# Patient Record
Sex: FEMALE | Race: BLACK OR AFRICAN AMERICAN | NOT HISPANIC OR LATINO | Employment: UNEMPLOYED | URBAN - METROPOLITAN AREA
[De-identification: names, ages, dates, MRNs, and addresses within clinical notes are randomized per-mention and may not be internally consistent; named-entity substitution may affect disease eponyms.]

---

## 2023-07-19 ENCOUNTER — PATIENT MESSAGE (OUTPATIENT)
Dept: RESEARCH | Facility: HOSPITAL | Age: 33
End: 2023-07-19
Payer: MEDICAID

## 2023-07-24 ENCOUNTER — PATIENT MESSAGE (OUTPATIENT)
Dept: RESEARCH | Facility: HOSPITAL | Age: 33
End: 2023-07-24
Payer: MEDICAID

## 2023-08-12 PROCEDURE — 99282 EMERGENCY DEPT VISIT SF MDM: CPT

## 2023-08-13 ENCOUNTER — HOSPITAL ENCOUNTER (EMERGENCY)
Facility: HOSPITAL | Age: 33
Discharge: HOME | End: 2023-08-13
Attending: EMERGENCY MEDICINE
Payer: MEDICAID

## 2023-08-13 VITALS
DIASTOLIC BLOOD PRESSURE: 69 MMHG | RESPIRATION RATE: 17 BRPM | WEIGHT: 196.19 LBS | TEMPERATURE: 98 F | SYSTOLIC BLOOD PRESSURE: 120 MMHG | OXYGEN SATURATION: 98 % | HEART RATE: 80 BPM | BODY MASS INDEX: 35.89 KG/M2

## 2023-08-13 DIAGNOSIS — U07.1 COVID-19: Primary | ICD-10-CM

## 2023-08-13 DIAGNOSIS — U07.1 COVID-19 VIRUS DETECTED: ICD-10-CM

## 2023-08-13 LAB
INFLUENZA A, MOLECULAR: NEGATIVE
INFLUENZA B, MOLECULAR: NEGATIVE
SARS-COV-2 RDRP RESP QL NAA+PROBE: POSITIVE
SPECIMEN SOURCE: NORMAL

## 2023-08-13 PROCEDURE — 87502 INFLUENZA DNA AMP PROBE: CPT | Performed by: EMERGENCY MEDICINE

## 2023-08-13 PROCEDURE — U0002 COVID-19 LAB TEST NON-CDC: HCPCS | Performed by: EMERGENCY MEDICINE

## 2023-08-13 NOTE — ED PROVIDER NOTES
SCRIBE #1 NOTE: I, Adiel García, am scribing for, and in the presence of, Hector Morales Jr., MD. I have scribed the entire note.       History     Chief Complaint   Patient presents with    Cough     Pt is experiencing runny nose, cough, and a headache with an onset of two days ago. Pt is pregnant .      Review of patient's allergies indicates:  No Known Allergies      History of Present Illness     HPI    2023, 1:18 AM  History obtained from the patient      History of Present Illness: Olga Hernandez is a 33 y.o. female patient who presents to the Emergency Department for evaluation of cough which onset 2 days PTA. Symptoms are constant and moderate in severity. No mitigating or exacerbating factors reported. Associated sxs include rhinorrhea, congestion, and sore throat. Patient denies any CP, abd pain, N/V/D, headaches, and all other sxs at this time. No prior Tx. No further complaints or concerns at this time. Patient is currently pregnant      Arrival mode: Personal vehicle      PCP: Jose Daley MD        Past Medical History:  Past Medical History:   Diagnosis Date    Abnormal Pap smear of cervix        Past Surgical History:  Past Surgical History:   Procedure Laterality Date     SECTION      DILATION AND CURETTAGE OF UTERUS      LAPAROSCOPIC CHOLECYSTECTOMY           Family History:  Family History   Family history unknown: Yes       Social History:  Social History     Tobacco Use    Smoking status: Never    Smokeless tobacco: Never   Substance and Sexual Activity    Alcohol use: No    Drug use: Never    Sexual activity: Yes     Partners: Male     Birth control/protection: Injection        Review of Systems     Review of Systems   Constitutional:  Negative for fever.   HENT:  Positive for congestion, rhinorrhea and sore throat.    Respiratory:  Positive for cough. Negative for shortness of breath.    Cardiovascular:  Negative for chest pain.   Gastrointestinal:  Negative  for abdominal pain, diarrhea, nausea and vomiting.   Genitourinary:  Negative for dysuria.   Musculoskeletal:  Negative for back pain.   Skin:  Negative for rash.   Neurological:  Negative for weakness and headaches.   Hematological:  Does not bruise/bleed easily.   All other systems reviewed and are negative.       Physical Exam     Initial Vitals [08/13/23 0004]   BP Pulse Resp Temp SpO2   120/69 80 17 98.1 °F (36.7 °C) 98 %      MAP       --          Physical Exam  Nursing Notes and Vital Signs Reviewed.  Constitutional: Patient is in no acute distress. Well-developed and well-nourished.  Head: Atraumatic. Normocephalic.  Eyes:  EOM intact.  No scleral icterus.  ENT: Mucous membranes are moist.  Nares with clear rhinorrhea.  Oropharynx shows mild erythema but no pus.  Neck:  Full ROM. No JVD.  No lymphadenopathy  Cardiovascular: Regular rate. Regular rhythm No murmurs, rubs, or gallops. Distal pulses are 2+ and symmetric  Pulmonary/Chest: No respiratory distress. Clear to auscultation bilaterally. No wheezing or rales.  Equal chest wall rise bilaterally  Abdominal: Soft and non-distended.  There is no tenderness.  No rebound, guarding, or rigidity. Good bowel sounds.  Musculoskeletal: Moves all extremities. No obvious deformities.  5 x 5 strength in all extremities   Skin: Warm and dry.  Neurological:  Alert, awake, and appropriate.  Normal speech.  No acute focal neurological deficits are appreciated.  Two through 12 intact bilaterally.       ED Course   Procedures  ED Vital Signs:  Vitals:    08/13/23 0004   BP: 120/69   Pulse: 80   Resp: 17   Temp: 98.1 °F (36.7 °C)   TempSrc: Oral   SpO2: 98%   Weight: 89 kg (196 lb 3.4 oz)       Abnormal Lab Results:  Labs Reviewed   SARS-COV-2 RNA AMPLIFICATION, QUAL - Abnormal; Notable for the following components:       Result Value    SARS-CoV-2 RNA, Amplification, Qual Positive (*)     All other components within normal limits   INFLUENZA A & B BY MOLECULAR        All  Lab Results:  Results for orders placed or performed during the hospital encounter of 08/13/23   Influenza A & B by Molecular    Specimen: Nasopharyngeal Swab   Result Value Ref Range    Influenza A, Molecular Negative Negative    Influenza B, Molecular Negative Negative    Flu A & B Source Nasal swab    COVID-19 Rapid Screening   Result Value Ref Range    SARS-CoV-2 RNA, Amplification, Qual Positive (A) Negative        Imaging Results:  Imaging Results    None                   The Emergency Provider reviewed the vital signs and test results, which are outlined above.     ED Discussion       1:49 AM: Reassessed pt at this time. Discussed with pt all pertinent ED information and results. Discussed pt dx and plan of tx. Gave pt all f/u and return to the ED instructions. All questions and concerns were addressed at this time. Pt expresses understanding of information and instructions, and is comfortable with plan to discharge. Pt is stable for discharge.    I discussed with patient and/or family/caretaker that evaluation in the ED does not suggest any emergent or life threatening medical conditions requiring immediate intervention beyond what was provided in the ED, and I believe patient is safe for discharge.  Regardless, an unremarkable evaluation in the ED does not preclude the development or presence of a serious of life threatening condition. As such, patient was instructed to return immediately for any worsening or change in current symptoms.         Medical Decision Making:   Initial Assessment:   Pregnant 33-year-old with multiple family members with similar illness here with her.  She has a runny nose sore throat cough.  No fevers.  Denies any abdominal pain.  No pregnancy-related complaints.  Physical exam is benign.  Differential Diagnosis:   Flu, COVID, viral illness  Clinical Tests:   Lab Tests: Ordered and Reviewed  ED Management:  Patient was evaluated history physical examination.  Flu and COVID were  obtained.  She has a negative flu and a positive COVID.  Patient's vital signs are stable this is the patient.  She is pregnant we will defer on Paxlovid.  I have recommended Motrin Tylenol for fever body aches Robitussin for cough.  She is a quarantine per CDC guidelines and follow up with her doctor 1-2 days for re-evaluation.  I have advised her to quarantine per CDC guidelines.  She verbalized understanding and agreement with the plan of care.           ED Medication(s):  Medications - No data to display    Discharge Medication List as of 8/13/2023  1:59 AM           Follow-up Information       Jose Daley MD.    Specialty: Family Medicine  Contact information:  [address]  [address]  [facility] [address]  [phone]                                 Scribe Attestation:   Scribe #1: I performed the above scribed service and the documentation accurately describes the services I performed. I attest to the accuracy of the note.     Attending:   Physician Attestation Statement for Scribe #1: I, Hector Morales Jr., MD, personally performed the services described in this documentation, as scribed by Adiel García, in my presence, and it is both accurate and complete.           Clinical Impression       ICD-10-CM ICD-9-CM   1. COVID-19  U07.1 079.89       Disposition:   Disposition: Discharged  Condition: Stable         Hector Morales Jr., MD  08/13/23 0214

## 2023-08-13 NOTE — Clinical Note
"Olga "Ian Hernandez was seen and treated in our emergency department on 8/12/2023.     COVID-19 is present in our communities across the state. There is limited testing for COVID at this time, so not all patients can be tested. In this situation, your employee meets the following criteria:    Olga Hernandez has met the criteria for COVID-19 testing based upon symptoms, travel, and/or potential exposure. The test has been completed and is pending results at this time. During this time the employee is not able to work and should be quarantined per the Centers for Disease Control timelines.     If you have any questions or concerns, or if I can be of further assistance, please do not hesitate to contact me.    Sincerely,             Hector Morales Jr., MD"

## 2023-11-05 ENCOUNTER — HOSPITAL ENCOUNTER (OUTPATIENT)
Facility: HOSPITAL | Age: 33
Discharge: HOME | End: 2023-11-05
Attending: OBSTETRICS & GYNECOLOGY | Admitting: OBSTETRICS & GYNECOLOGY
Payer: MEDICAID

## 2023-11-05 VITALS
DIASTOLIC BLOOD PRESSURE: 69 MMHG | TEMPERATURE: 98 F | SYSTOLIC BLOOD PRESSURE: 122 MMHG | HEART RATE: 61 BPM | RESPIRATION RATE: 18 BRPM | OXYGEN SATURATION: 100 %

## 2023-11-05 DIAGNOSIS — O47.03 THREATENED PREMATURE LABOR IN THIRD TRIMESTER: ICD-10-CM

## 2023-11-05 LAB
AMPHET+METHAMPHET UR QL: NEGATIVE
BARBITURATES UR QL SCN>200 NG/ML: NEGATIVE
BENZODIAZ UR QL SCN>200 NG/ML: NEGATIVE
BILIRUB UR QL STRIP: NEGATIVE
BZE UR QL SCN: NEGATIVE
CANNABINOIDS UR QL SCN: NEGATIVE
CLARITY UR: CLEAR
COLOR UR: YELLOW
CREAT UR-MCNC: 88.1 MG/DL (ref 15–325)
FIBRONECTIN FETAL SPEC QL: NEGATIVE
GLUCOSE UR QL STRIP: NEGATIVE
HGB UR QL STRIP: NEGATIVE
KETONES UR QL STRIP: NEGATIVE
LEUKOCYTE ESTERASE UR QL STRIP: ABNORMAL
METHADONE UR QL SCN>300 NG/ML: NEGATIVE
MICROSCOPIC COMMENT: NORMAL
NITRITE UR QL STRIP: NEGATIVE
OPIATES UR QL SCN: NEGATIVE
PCP UR QL SCN>25 NG/ML: NEGATIVE
PH UR STRIP: 8 [PH] (ref 5–8)
PROT UR QL STRIP: NEGATIVE
SP GR UR STRIP: 1.01 (ref 1–1.03)
SQUAMOUS #/AREA URNS HPF: 6 /HPF
TOXICOLOGY INFORMATION: NORMAL
URN SPEC COLLECT METH UR: ABNORMAL
UROBILINOGEN UR STRIP-ACNC: NEGATIVE EU/DL
WBC #/AREA URNS HPF: 3 /HPF (ref 0–5)
WBC CLUMPS URNS QL MICRO: NORMAL

## 2023-11-05 PROCEDURE — 25000003 PHARM REV CODE 250: Performed by: MIDWIFE

## 2023-11-05 PROCEDURE — 81000 URINALYSIS NONAUTO W/SCOPE: CPT | Mod: 59 | Performed by: MIDWIFE

## 2023-11-05 PROCEDURE — 59025 OBTAIN FETAL NONSTRESS TEST (NST): ICD-10-PCS | Mod: 26,,, | Performed by: MIDWIFE

## 2023-11-05 PROCEDURE — 59025 FETAL NON-STRESS TEST: CPT | Mod: 26,,, | Performed by: MIDWIFE

## 2023-11-05 PROCEDURE — 63600175 PHARM REV CODE 636 W HCPCS: Performed by: MIDWIFE

## 2023-11-05 PROCEDURE — 82731 ASSAY OF FETAL FIBRONECTIN: CPT | Performed by: MIDWIFE

## 2023-11-05 PROCEDURE — 59025 FETAL NON-STRESS TEST: CPT

## 2023-11-05 PROCEDURE — 99211 OFF/OP EST MAY X REQ PHY/QHP: CPT | Mod: 25

## 2023-11-05 PROCEDURE — 80307 DRUG TEST PRSMV CHEM ANLYZR: CPT | Performed by: MIDWIFE

## 2023-11-05 RX ORDER — NIFEDIPINE 10 MG/1
20 CAPSULE ORAL ONCE
Status: COMPLETED | OUTPATIENT
Start: 2023-11-05 | End: 2023-11-05

## 2023-11-05 RX ORDER — PROCHLORPERAZINE EDISYLATE 5 MG/ML
5 INJECTION INTRAMUSCULAR; INTRAVENOUS EVERY 6 HOURS PRN
Status: DISCONTINUED | OUTPATIENT
Start: 2023-11-05 | End: 2023-11-05 | Stop reason: HOSPADM

## 2023-11-05 RX ORDER — ACETAMINOPHEN 500 MG
500 TABLET ORAL EVERY 6 HOURS PRN
Status: DISCONTINUED | OUTPATIENT
Start: 2023-11-05 | End: 2023-11-05 | Stop reason: HOSPADM

## 2023-11-05 RX ORDER — ONDANSETRON 8 MG/1
8 TABLET, ORALLY DISINTEGRATING ORAL EVERY 8 HOURS PRN
Status: DISCONTINUED | OUTPATIENT
Start: 2023-11-05 | End: 2023-11-05 | Stop reason: HOSPADM

## 2023-11-05 RX ORDER — SODIUM CHLORIDE, SODIUM LACTATE, POTASSIUM CHLORIDE, CALCIUM CHLORIDE 600; 310; 30; 20 MG/100ML; MG/100ML; MG/100ML; MG/100ML
INJECTION, SOLUTION INTRAVENOUS CONTINUOUS
Status: DISCONTINUED | OUTPATIENT
Start: 2023-11-05 | End: 2023-11-05 | Stop reason: HOSPADM

## 2023-11-05 RX ORDER — TERBUTALINE SULFATE 1 MG/ML
0.25 INJECTION SUBCUTANEOUS ONCE
Status: COMPLETED | OUTPATIENT
Start: 2023-11-05 | End: 2023-11-05

## 2023-11-05 RX ADMIN — NIFEDIPINE 20 MG: 10 CAPSULE ORAL at 06:11

## 2023-11-05 RX ADMIN — TERBUTALINE SULFATE 0.25 MG: 1 INJECTION SUBCUTANEOUS at 07:11

## 2023-11-05 RX ADMIN — SODIUM CHLORIDE, POTASSIUM CHLORIDE, SODIUM LACTATE AND CALCIUM CHLORIDE: 600; 310; 30; 20 INJECTION, SOLUTION INTRAVENOUS at 05:11

## 2023-11-05 NOTE — H&P
[facility] - [facility] Delivery  Obstetrics  History & Physical    Patient Name: Olga Hernandez  MRN: [id number]  Admission Date: 2023  Primary Care Provider: Jose Daley MD    Subjective:     Principal Problem:Threatened premature labor in third trimester    History of Present Illness:  Olga Hernandez is a 33 y.o. female  31w4d from home with c/o contractions      Obstetric HPI:  Patient reports Date/time of onset: 23 @ 4am contractions, active fetal movement, No vaginal bleeding , No loss of fluid     This pregnancy has been complicated by Previous  x 5    OB History    Para Term  AB Living   8 6 6 0 1 6   SAB IAB Ectopic Multiple Live Births   1 0 0 0 6      # Outcome Date GA Lbr Amadou/2nd Weight Sex Delivery Anes PTL Lv   8 Current            7 Term 21 37w0d  3.118 kg (6 lb 14 oz) F CS-LTranv EPI  YAZMIN      Birth Comments: Very thin lower uterine segment   6 Term 04/10/19 39w0d  3.345 kg (7 lb 6 oz) F CS-LTranv EPI  YAZMIN   5 SAB 17 8w0d             Birth Comments: MAB/D&C; No FHB @ 8 weeks.   4 Term 09/17/15 37w0d  3.147 kg (6 lb 15 oz) F CS-LTranv EPI  YAZMIN   3 Term 12 39w0d  2.268 kg (5 lb) F CS-LTranv EPI  YAZMIN   2 Term 11 40w0d  3.175 kg (7 lb) F CS-LTranv EPI  YAZMIN   1 Term 10/02/09 39w0d  2.268 kg (5 lb) F Vag-Spont EPI  YAZMIN     Past Medical History:   Diagnosis Date    Abnormal Pap smear of cervix     COVID-19 2023     Past Surgical History:   Procedure Laterality Date     SECTION      DILATION AND CURETTAGE OF UTERUS      LAPAROSCOPIC CHOLECYSTECTOMY         PTA Medications   Medication Sig    ondansetron (ZOFRAN-ODT) 4 MG TbDL Take 2 tablets (8 mg total) by mouth 2 (two) times daily.    promethazine (PHENERGAN) 25 MG tablet Take 1 tablet (25 mg total) by mouth every 6 (six) hours as needed for Nausea.       Review of patient's allergies indicates:  No Known Allergies     Family History    Family history is unknown by patient.        Tobacco Use    Smoking status: Never    Smokeless tobacco: Never   Substance and Sexual Activity    Alcohol use: No    Drug use: Never    Sexual activity: Yes     Partners: Male     Birth control/protection: Injection     Review of Systems   Constitutional: Negative.    HENT: Negative.     Eyes: Negative.    Respiratory: Negative.     Cardiovascular: Negative.    Gastrointestinal:  Positive for abdominal pain (contractions).   Endocrine: Negative.    Genitourinary: Negative.    Musculoskeletal: Negative.    Integumentary:  Negative.   Neurological: Negative.    Hematological: Negative.    Psychiatric/Behavioral: Negative.     Breast: negative.       Objective:     Vital Signs (Most Recent):    Vital Signs (24h Range):           There is no height or weight on file to calculate BMI.    FHT: 140Cat 1 (reassuring)  TOCO:  Q 4-6 minutes     Physical Exam:   Constitutional: She is oriented to person, place, and time. She appears well-developed and well-nourished.    HENT:   Head: Normocephalic and atraumatic.      Cardiovascular:  Normal rate.             Pulmonary/Chest: Effort normal.        Abdominal: Soft.   gravid             Musculoskeletal: Normal range of motion and moves all extremeties.       Neurological: She is alert and oriented to person, place, and time.    Skin: Skin is warm and dry.    Psychiatric: She has a normal mood and affect. Her behavior is normal. Judgment and thought content normal.        Cervix:  Dilation:  FT per nurse  Effacement:  0%  Station: -3  Presentation:      Significant Labs:  Lab Results   Component Value Date    HEPBSAG Non-reactive 2023       I have personallly reviewed all pertinent lab results from the last 24 hours.    Assessment/Plan:     33 y.o. female  at 31w4d for:    * Threatened premature labor in third trimester  FHTs cat 1 and reassuring  Ctx q 4-6  Cervix FT/th/hi  FFN collected and sent  UA with drug screen  IVF bolus              Ansley Martin,  CNM  Obstetrics  [facility] - [facility]

## 2023-11-05 NOTE — ASSESSMENT & PLAN NOTE
FHTs cat 1 and reassuring  Ctx q 4-6  Cervix FT/th/hi  FFN collected and sent  UA with drug screen  IVF bolus  11/5/23 0705- ctx q 2- minutes, s/p Procardia x 1 dose. Continue iv fluids. Brethine ordered. FFN negative.

## 2023-11-05 NOTE — DISCHARGE SUMMARY
"[facility] - Labor & Delivery  Obstetrics  Discharge Summary      Patient Name: Olga Hernandez  MRN: [id number]  Admission Date: 2023  Hospital Length of Stay: 0 days  Discharge Date and Time:  2023 8:50 AM  Attending Physician: Leticia Hernandez MD   Discharging Provider: Anu Armijo CNM   Primary Care Provider: Jose Daley MD    HPI: Olga Hernandez is a 33 y.o. female  31w4d from home with c/o contractions      FHT: 150 Cat 1 (reassuring)  TOCO: none    * No surgery found *     Hospital Course:    31w4d who reports getting prenatal care from Dr. Ramos at [facility], presents to  with c/o contractions for 1 hour. Reports she came here cause "this was closer". Reports previous  x 5.   23 0705- ctx q 2- minutes, s/p Procardia x 1 dose. Continue iv fluids. Brethine ordered. FFN negative.   0840am- contractions have spaced, no cervical change in several hours. Discharge instructions discussed and when to return to the hospital.            Final Active Diagnoses:    Diagnosis Date Noted POA    PRINCIPAL PROBLEM:  Threatened premature labor in third trimester [O47.03] 2023 Unknown      Problems Resolved During this Admission:        Significant Diagnostic Studies: Labs: All labs within the past 24 hours have been reviewed      Immunizations     Date Immunization Status Dose Route/Site Given by    10/27/23 1120 Tdap Given 0.5 mL Intramuscular/Right deltoid Alisson Lovell CMA          This patient has no babies on file.  Pending Diagnostic Studies:     None          Discharged Condition: good    Disposition: Home or Self Care    Follow Up:    Patient Instructions:      Diet Adult Regular     Notify your health care provider if you experience any of the following:  temperature >100.4     Notify your health care provider if you experience any of the following:  persistent nausea and vomiting or diarrhea     Notify your health care provider if you experience any of the " following:  severe uncontrolled pain     Notify your health care provider if you experience any of the following:  severe persistent headache     Notify your health care provider if you experience any of the following:  difficulty breathing or increased cough     Notify your health care provider if you experience any of the following:  persistent dizziness, light-headedness, or visual disturbances     Notify your health care provider if you experience any of the following:  increased confusion or weakness     Notify your health care provider if you experience any of the following:   Order Comments: Decreased fetal movement, leaking of fluid, and/or vaginal bleeding     Activity as tolerated     Medications:  Current Discharge Medication List      CONTINUE these medications which have NOT CHANGED    Details   ondansetron (ZOFRAN-ODT) 4 MG TbDL Take 2 tablets (8 mg total) by mouth 2 (two) times daily.  Qty: 30 tablet, Refills: 2    Associated Diagnoses: Nausea      promethazine (PHENERGAN) 25 MG tablet Take 1 tablet (25 mg total) by mouth every 6 (six) hours as needed for Nausea.  Qty: 20 tablet, Refills: 2             Anu Armijo CNM  Obstetrics  [facility]

## 2023-11-05 NOTE — PROGRESS NOTES
"[facility] - [facility] Delivery  Obstetrics  Antepartum Progress Note    Patient Name: Olga Hernandez  MRN: [id number]  Admission Date: 2023  Hospital Length of Stay: 0 days  Attending Physician: Leticia Hernandez MD  Primary Care Provider: Jose Daley MD    Subjective:     Principal Problem:Threatened premature labor in third trimester    HPI:  Olga Hernandez is a 33 y.o. female  31w4d from home with c/o contractions      Hospital Course:   31w4d who reports getting prenatal care from Dr. Ramos at [facility], presents to  with c/o contractions for 1 hour. Reports she came here cause "this was closer". Reports previous  x 5.   23 0705- ctx q 2-3 minutes, s/p Procardia x 1 dose. Continue iv fluids. Brethine ordered. FFN negative.       Obstetric HPI:  Patient reports regular contractions, active fetal movement, absent vaginal bleeding , absent loss of fluid      Objective:     Vital Signs (Most Recent):  Temp: 98.2 °F (36.8 °C) (23 0430)  Pulse: 61 (23 0620)  Resp: 18 (23 0430)  BP: 122/69 (23 0610)  SpO2: 100 % (23 06) Vital Signs (24h Range):  Temp:  [98.2 °F (36.8 °C)] 98.2 °F (36.8 °C)  Pulse:  [61-88] 61  Resp:  [18] 18  SpO2:  [100 %] 100 %  BP: (114-122)/(68-69) 122/69        There is no height or weight on file to calculate BMI.    FHT: 155 Cat 1 (reassuring)  TOCO:  Q 2-3 minutes    No intake or output data in the 24 hours ending 23 0708    Cervical Exam:deferred at this time     Significant Labs:  Recent Lab Results         23  0451   23  0449        Benzodiazepines   Negative       Methadone metabolites   Negative       Phencyclidine   Negative       Amphetamine Screen, Ur   Negative       Appearance, UA Clear         Barbiturate Screen, Ur   Negative       Bilirubin (UA) Negative         Cocaine (Metab.)   Negative       Color, UA Yellow         Creatinine, Urine   88.1       Fetal Fibronectin   Negative       Glucose, UA " Negative         Ketones, UA Negative         Leukocytes, UA Trace         Microscopic Comment SEE COMMENT  Comment: Other formed elements not mentioned in the report are not   present in the microscopic examination.            NITRITE UA Negative         Occult Blood UA Negative         Opiate Scrn, Ur   Negative       pH, UA 8.0         Protein, UA Negative  Comment: Recommend a 24 hour urine protein or a urine   protein/creatinine ratio if globulin induced proteinuria is  clinically suspected.           Specific [place], UA 1.015         Specimen UA Urine, Clean Catch         Squam Epithel, UA 6         Marijuana (THC) Metabolite   Negative       Toxicology Information   SEE COMMENT  Comment: This screen includes the following classes of drugs at the listed   cut-off:    Benzodiazepines 200 ng/ml  Methadone 300 ng/ml  Cocaine metabolite 300 ng/ml  Opiates 300 ng/ml  Barbiturates 200 ng/ml  Amphetamines 1000 ng/ml  Marijuana metabs (THC) 50 ng/ml  Phencyclidine (PCP) 25 ng/ml    This is a screening test. If results do not correlate with clinical   presentation, then a confirmatory send out test is advised.     This report is intended for use in clinical monitoring and management   of   patients. It is not intended for use in employment related drug   testing.         UROBILINOGEN UA Negative         WBC Clumps, UA Rare         WBC, UA 3                 Physical Exam:   Constitutional: She is oriented to person, place, and time. She appears well-developed and well-nourished.    HENT:   Head: Normocephalic.    Eyes: Conjunctivae are normal.      Pulmonary/Chest: Effort normal.        Abdominal: Soft.             Musculoskeletal: Normal range of motion.       Neurological: She is alert and oriented to person, place, and time.    Skin: Skin is warm and dry.    Psychiatric: She has a normal mood and affect. Her behavior is normal. Judgment and thought content normal.       Review of Systems    Assessment/Plan:     33  y.o. female  at 31w4d for:    * Threatened premature labor in third trimester  FHTs cat 1 and reassuring  Ctx q 4-6  Cervix FT//hi  FFN collected and sent  UA with drug screen  IVF bolus  23- ctx q 2-3 minutes, s/p Procardia x 1 dose. Continue iv fluids. Brethine ordered. FFN negative.           Anu Armijo CNM  Obstetrics  [facility]

## 2023-11-05 NOTE — HOSPITAL COURSE
" 31w4d who reports [facility], presents to  with c/o contractions for 1 hour. Reports she came here cause "this was closer". Reports previous  x 5.   23 0705- ctx q 2- minutes, s/p Procardia x 1 dose. Continue iv fluids. Brethine ordered. FFN negative.   0840am- contractions have spaced, no cervical change in several hours. Discharge instructions discussed and when to return to the hospital.   "

## 2023-11-05 NOTE — SUBJECTIVE & OBJECTIVE
Obstetric HPI:  Patient reports regular contractions, active fetal movement, absent vaginal bleeding , absent loss of fluid      Objective:     Vital Signs (Most Recent):  Temp: 98.2 °F (36.8 °C) (11/05/23 0430)  Pulse: 61 (11/05/23 0620)  Resp: 18 (11/05/23 0430)  BP: 122/69 (11/05/23 0610)  SpO2: 100 % (11/05/23 0620) Vital Signs (24h Range):  Temp:  [98.2 °F (36.8 °C)] 98.2 °F (36.8 °C)  Pulse:  [61-88] 61  Resp:  [18] 18  SpO2:  [100 %] 100 %  BP: (114-122)/(68-69) 122/69        There is no height or weight on file to calculate BMI.    FHT: 155 Cat 1 (reassuring)  TOCO:  Q 2-3 minutes    No intake or output data in the 24 hours ending 11/05/23 0708    Cervical Exam:deferred at this time     Significant Labs:  Recent Lab Results         11/05/23  0451   11/05/23  0449        Benzodiazepines   Negative       Methadone metabolites   Negative       Phencyclidine   Negative       Amphetamine Screen, Ur   Negative       Appearance, UA Clear         Barbiturate Screen, Ur   Negative       Bilirubin (UA) Negative         Cocaine (Metab.)   Negative       Color, UA Yellow         Creatinine, Urine   88.1       Fetal Fibronectin   Negative       Glucose, UA Negative         Ketones, UA Negative         Leukocytes, UA Trace         Microscopic Comment SEE COMMENT  Comment: Other formed elements not mentioned in the report are not   present in the microscopic examination.            NITRITE UA Negative         Occult Blood UA Negative         Opiate Scrn, Ur   Negative       pH, UA 8.0         Protein, UA Negative  Comment: Recommend a 24 hour urine protein or a urine   protein/creatinine ratio if globulin induced proteinuria is  clinically suspected.           Specific [place], UA 1.015         Specimen UA Urine, Clean Catch         Squam Epithel, UA 6         Marijuana (THC) Metabolite   Negative       Toxicology Information   SEE COMMENT  Comment: This screen includes the following classes of drugs at the listed    cut-off:    Benzodiazepines 200 ng/ml  Methadone 300 ng/ml  Cocaine metabolite 300 ng/ml  Opiates 300 ng/ml  Barbiturates 200 ng/ml  Amphetamines 1000 ng/ml  Marijuana metabs (THC) 50 ng/ml  Phencyclidine (PCP) 25 ng/ml    This is a screening test. If results do not correlate with clinical   presentation, then a confirmatory send out test is advised.     This report is intended for use in clinical monitoring and management   of   patients. It is not intended for use in employment related drug   testing.         UROBILINOGEN UA Negative         WBC Clumps, UA Rare         WBC, UA 3                 Physical Exam:   Constitutional: She is oriented to person, place, and time. She appears well-developed and well-nourished.    HENT:   Head: Normocephalic.    Eyes: Conjunctivae are normal.      Pulmonary/Chest: Effort normal.        Abdominal: Soft.             Musculoskeletal: Normal range of motion.       Neurological: She is alert and oriented to person, place, and time.    Skin: Skin is warm and dry.    Psychiatric: She has a normal mood and affect. Her behavior is normal. Judgment and thought content normal.       Review of Systems

## 2023-11-05 NOTE — PROCEDURES
Olga Hernandez is a 33 y.o. female patient.    Temp: 98.2 °F (36.8 °C) (11/05/23 0430)  Pulse: 88 (11/05/23 0450)  Resp: 18 (11/05/23 0430)  BP: 114/68 (11/05/23 0420)  SpO2: 100 % (11/05/23 0450)       Obtain Fetal nonstress test (NST)    Date/Time: 11/5/2023 5:09 AM    Performed by: Ansley Martin CNM  Authorized by: Ansley Martin CNM    Nonstress Test:     Variability:  6-25 BPM    Decelerations:  None    Accelerations:  10 bpm    Acoustic Stimulator: No      Baseline:  140    Uterine Irritability: No      Contractions:  Irregular    Contraction Frequency:  4-6  Biophysical Profile:     Nonstress Test Interpretation: reactive      Overall Impression:  Reassuring  Post-procedure:     Patient tolerance:  Patient tolerated the procedure well with no immediate complications      11/5/2023

## 2023-11-05 NOTE — DISCHARGE INSTRUCTIONS

## 2023-11-05 NOTE — SUBJECTIVE & OBJECTIVE
Obstetric HPI:  Patient reports Date/time of onset: 23 @ 4am contractions, active fetal movement, No vaginal bleeding , No loss of fluid     This pregnancy has been complicated by Previous  x 5    OB History    Para Term  AB Living   8 6 6 0 1 6   SAB IAB Ectopic Multiple Live Births   1 0 0 0 6      # Outcome Date GA Lbr Amadou/2nd Weight Sex Delivery Anes PTL Lv   8 Current            7 Term 21 37w0d  3.118 kg (6 lb 14 oz) F CS-LTranv EPI  YAZMIN      Birth Comments: Very thin lower uterine segment   6 Term 04/10/19 39w0d  3.345 kg (7 lb 6 oz) F CS-LTranv EPI  YAZMIN   5 SAB 17 8w0d             Birth Comments: MAB/D&C; No FHB @ 8 weeks.   4 Term 09/17/15 37w0d  3.147 kg (6 lb 15 oz) F CS-LTranv EPI  YAZMIN   3 Term 12 39w0d  2.268 kg (5 lb) F CS-LTranv EPI  YAZMIN   2 Term 11 40w0d  3.175 kg (7 lb) F CS-LTranv EPI  YAZMIN   1 Term 10/02/09 39w0d  2.268 kg (5 lb) F Vag-Spont EPI  YAZMIN     Past Medical History:   Diagnosis Date    Abnormal Pap smear of cervix     COVID-19 2023     Past Surgical History:   Procedure Laterality Date     SECTION      DILATION AND CURETTAGE OF UTERUS      LAPAROSCOPIC CHOLECYSTECTOMY         PTA Medications   Medication Sig    ondansetron (ZOFRAN-ODT) 4 MG TbDL Take 2 tablets (8 mg total) by mouth 2 (two) times daily.    promethazine (PHENERGAN) 25 MG tablet Take 1 tablet (25 mg total) by mouth every 6 (six) hours as needed for Nausea.       Review of patient's allergies indicates:  No Known Allergies     Family History    Family history is unknown by patient.       Tobacco Use    Smoking status: Never    Smokeless tobacco: Never   Substance and Sexual Activity    Alcohol use: No    Drug use: Never    Sexual activity: Yes     Partners: Male     Birth control/protection: Injection     Review of Systems   Constitutional: Negative.    HENT: Negative.     Eyes: Negative.    Respiratory: Negative.     Cardiovascular: Negative.     Gastrointestinal:  Positive for abdominal pain (contractions).   Endocrine: Negative.    Genitourinary: Negative.    Musculoskeletal: Negative.    Integumentary:  Negative.   Neurological: Negative.    Hematological: Negative.    Psychiatric/Behavioral: Negative.     Breast: negative.       Objective:     Vital Signs (Most Recent):    Vital Signs (24h Range):           There is no height or weight on file to calculate BMI.    FHT: 140Cat 1 (reassuring)  TOCO:  Q 4-6 minutes     Physical Exam:   Constitutional: She is oriented to person, place, and time. She appears well-developed and well-nourished.    HENT:   Head: Normocephalic and atraumatic.      Cardiovascular:  Normal rate.             Pulmonary/Chest: Effort normal.        Abdominal: Soft.   gravid             Musculoskeletal: Normal range of motion and moves all extremeties.       Neurological: She is alert and oriented to person, place, and time.    Skin: Skin is warm and dry.    Psychiatric: She has a normal mood and affect. Her behavior is normal. Judgment and thought content normal.        Cervix:  Dilation:  FT per nurse  Effacement:  0%  Station: -3  Presentation:      Significant Labs:  Lab Results   Component Value Date    HEPBSAG Non-reactive 05/09/2023       I have personallly reviewed all pertinent lab results from the last 24 hours.

## 2023-11-05 NOTE — ASSESSMENT & PLAN NOTE
FHTs cat 1 and reassuring  Ctx q 4-6  Cervix FT/th/hi  FFN collected and sent  UA with drug screen  IVF bolus